# Patient Record
Sex: MALE | Race: WHITE | URBAN - METROPOLITAN AREA
[De-identification: names, ages, dates, MRNs, and addresses within clinical notes are randomized per-mention and may not be internally consistent; named-entity substitution may affect disease eponyms.]

---

## 2017-09-05 ENCOUNTER — OFFICE VISIT (OUTPATIENT)
Dept: FAMILY MEDICINE CLINIC | Age: 37
End: 2017-09-05

## 2017-09-05 VITALS
HEIGHT: 77 IN | TEMPERATURE: 98.1 F | WEIGHT: 297 LBS | SYSTOLIC BLOOD PRESSURE: 132 MMHG | RESPIRATION RATE: 18 BRPM | BODY MASS INDEX: 35.07 KG/M2 | HEART RATE: 76 BPM | DIASTOLIC BLOOD PRESSURE: 70 MMHG | OXYGEN SATURATION: 98 %

## 2017-09-05 DIAGNOSIS — F41.1 GAD (GENERALIZED ANXIETY DISORDER): ICD-10-CM

## 2017-09-05 DIAGNOSIS — Z72.0 TOBACCO ABUSE: ICD-10-CM

## 2017-09-05 DIAGNOSIS — Z99.89 OSA ON CPAP: ICD-10-CM

## 2017-09-05 DIAGNOSIS — Z83.3 FAMILY HISTORY OF DIABETES MELLITUS IN MOTHER: ICD-10-CM

## 2017-09-05 DIAGNOSIS — Z00.00 PREVENTATIVE HEALTH CARE: Primary | ICD-10-CM

## 2017-09-05 DIAGNOSIS — G47.33 OSA ON CPAP: ICD-10-CM

## 2017-09-05 DIAGNOSIS — Z82.49 FAMILY HISTORY OF CARDIOMEGALY: ICD-10-CM

## 2017-09-05 DIAGNOSIS — E66.9 OBESITY (BMI 30-39.9): ICD-10-CM

## 2017-09-05 RX ORDER — SERTRALINE HYDROCHLORIDE 50 MG/1
50 TABLET, FILM COATED ORAL DAILY
Qty: 90 TAB | Refills: 4 | Status: SHIPPED | OUTPATIENT
Start: 2017-09-05

## 2017-09-05 RX ORDER — SERTRALINE HYDROCHLORIDE 50 MG/1
TABLET, FILM COATED ORAL DAILY
COMMUNITY
End: 2017-09-05 | Stop reason: SDUPTHER

## 2017-09-05 RX ORDER — ALPRAZOLAM 0.5 MG/1
0.5 TABLET ORAL
Qty: 20 TAB | Refills: 0 | Status: SHIPPED | OUTPATIENT
Start: 2017-09-05

## 2017-09-05 RX ORDER — LORAZEPAM 0.5 MG/1
TABLET ORAL AS NEEDED
COMMUNITY
End: 2017-09-05

## 2017-09-05 NOTE — PATIENT INSTRUCTIONS
Ideas for diet:   Less trailmix, beer, and soda  Eat out as little as possible (pack a lunch, make dinners at home)  Make a weekly meal plan, then shop for the food you will need. You will be less tempted to eat out or eat junk food if you have a plan. Avoid drinks with calories (try crystal light, sugar-free sheri aid, sweet tea made with splenda, diet sodas, water with lime)   For lunch, consider whole wheat bread, light moss or mustard, turkey breast, light cheese (or no cheese), lots of veggies on sandwich   Low calorie side dishes/snacks: pretzels, baked chips, 100-calorie snacks, light yogurt, fresh fruit (apples, berries, oranges), cut up veggies with light dip, light popcorn   For supper, fill up 2/3 of plate with veggies (not corn or potatoes), then very small portions of everything else   If you use salad dressing, get light, and try dipping fork in it instead of putting it all over salad   Consider buying a light-cooking cookbook and trying some new recipes   Exercise more, even 10 minutes daily would help      See if you can find record of your last TDAP tetanus shot date and let me know        Well Visit, Ages 25 to 48: Care Instructions  Your Care Instructions  Physical exams can help you stay healthy. Your doctor has checked your overall health and may have suggested ways to take good care of yourself. He or she also may have recommended tests. At home, you can help prevent illness with healthy eating, regular exercise, and other steps. Follow-up care is a key part of your treatment and safety. Be sure to make and go to all appointments, and call your doctor if you are having problems. It's also a good idea to know your test results and keep a list of the medicines you take. How can you care for yourself at home? · Reach and stay at a healthy weight. This will lower your risk for many problems, such as obesity, diabetes, heart disease, and high blood pressure.   · Get at least 30 minutes of physical activity on most days of the week. Walking is a good choice. You also may want to do other activities, such as running, swimming, cycling, or playing tennis or team sports. Discuss any changes in your exercise program with your doctor. · Do not smoke or allow others to smoke around you. If you need help quitting, talk to your doctor about stop-smoking programs and medicines. These can increase your chances of quitting for good. · Talk to your doctor about whether you have any risk factors for sexually transmitted infections (STIs). Having one sex partner (who does not have STIs and does not have sex with anyone else) is a good way to avoid these infections. · Use birth control if you do not want to have children at this time. Talk with your doctor about the choices available and what might be best for you. · Protect your skin from too much sun. When you're outdoors from 10 a.m. to 4 p.m., stay in the shade or cover up with clothing and a hat with a wide brim. Wear sunglasses that block UV rays. Even when it's cloudy, put broad-spectrum sunscreen (SPF 30 or higher) on any exposed skin. · See a dentist one or two times a year for checkups and to have your teeth cleaned. · Wear a seat belt in the car. · Drink alcohol in moderation, if at all. That means no more than 2 drinks a day for men and 1 drink a day for women. Follow your doctor's advice about when to have certain tests. These tests can spot problems early. For everyone  · Cholesterol. Have the fat (cholesterol) in your blood tested after age 21. Your doctor will tell you how often to have this done based on your age, family history, or other things that can increase your risk for heart disease. · Blood pressure. Have your blood pressure checked during a routine doctor visit. Your doctor will tell you how often to check your blood pressure based on your age, your blood pressure results, and other factors. · Vision.  Talk with your doctor about how often to have a glaucoma test.  · Diabetes. Ask your doctor whether you should have tests for diabetes. · Colon cancer. Have a test for colon cancer at age 48. You may have one of several tests. If you are younger than 48, you may need a test earlier if you have any risk factors. Risk factors include whether you already had a precancerous polyp removed from your colon or whether your parent, brother, sister, or child has had colon cancer. For women  · Breast exam and mammogram. Talk to your doctor about when you should have a clinical breast exam and a mammogram. Medical experts differ on whether and how often women under 50 should have these tests. Your doctor can help you decide what is right for you. · Pap test and pelvic exam. Begin Pap tests at age 24. A Pap test is the best way to find cervical cancer. The test often is part of a pelvic exam. Ask how often to have this test.  · Tests for sexually transmitted infections (STIs). Ask whether you should have tests for STIs. You may be at risk if you have sex with more than one person, especially if your partners do not wear condoms. For men  · Tests for sexually transmitted infections (STIs). Ask whether you should have tests for STIs. You may be at risk if you have sex with more than one person, especially if you do not wear a condom. · Testicular cancer exam. Ask your doctor whether you should check your testicles regularly. · Prostate exam. Talk to your doctor about whether you should have a blood test (called a PSA test) for prostate cancer. Experts differ on whether and when men should have this test. Some experts suggest it if you are older than 39 and are -American or have a father or brother who got prostate cancer when he was younger than 72. When should you call for help? Watch closely for changes in your health, and be sure to contact your doctor if you have any problems or symptoms that concern you. Where can you learn more?   Go to http://crescencio-yanna.info/. Enter P072 in the search box to learn more about \"Well Visit, Ages 25 to 48: Care Instructions. \"  Current as of: July 19, 2016  Content Version: 11.3  © 5795-3131 [employer]. Care instructions adapted under license by [employer] (which disclaims liability or warranty for this information). If you have questions about a medical condition or this instruction, always ask your healthcare professional. [place] [address] any warranty or liability for your use of this information. To schedule an outpatient imaging test, please call:  [phone] [facility] scheduling)   (ECHO)    Medical Supply Stores:    Janiaah Castleman  Address: [facility], [address] [place] [address]   Phone: [phone]    [address] [place] [address]. Pharmacy  [address] [place] [address] ?   [phone] ? · [facility]     [address], [address] [place] [address]  [phone]  Same fax number    [facility] [address]   [phone]    [facility] (CPAP supplies)  Beatrice Byers[address], [place] [address]  Phone: [phone]  Fax: [phone]

## 2017-09-05 NOTE — PROGRESS NOTES
Chief Complaint   Patient presents with    New Patient     to [facility], new to [address] [place] [address] in preparation for visit and have obtained necessary documentation. Identified pt with two pt identifiers (Name @ )    Health Maintenance Due   Topic    DTaP/Tdap/Td series (1 - Tdap)    INFLUENZA AGE 9 TO ADULT          1. Have you been to the ER, urgent care clinic since your last visit? Hospitalized since your last visit? No    2. Have you seen or consulted any other health care providers outside of the [facility] since your last visit? Include any pap smears or colon screening.  No

## 2017-09-05 NOTE — PROGRESS NOTES
[address] [facility]. [place], [address]  [phone]             Date of visit: 9/5/2017   Subjective:      History obtained from:  the patient. Alex Tomlin is a 40 y.o. male who presents today for new patient to me, I see his wife, Manpreet Wagoner    Overall healthy  Due for physical and labs  Thinks he had tdap 2 yr ago, will try to get record  Doesn't want flu shot    Takes zoloft at least every other day but misses it when he doesn't  Has nervousness and sob with airplanes, in front of crowds  Has had mild panic attack before  Mostly just feeling like he has to get out of situation. Has xanax, 15 pills have lasted him 8 months    Desk job  Only exercising about once per week, sometimes swims, sometimes bike and some weights  Sits on ball for back and thinks it helps    Too much cheese and pizza he thinks  Wife is vegetarian so that helps  Breakfast: trail mix: nuts, raisins, cranraisins, dried fruits sometimes M&Ms  Lunch: brings different kinds sandwiches with mustard, a yogurt and a piece of fruit, might have a Tanja bar, slowly eats it throughout the days  Dinner: varies a lot, doesn't eat out much  Drinks: mostly coffee and some water, occasional soda  Etoh: 1 beer nightly, maybe more on weekends    Seeing a chiropractor for his back,David Butler  He is helping him  Back not that bad, a little worse today after pulling up stumps yesterday      Patient Active Problem List    Diagnosis Date Noted    YANIQUE (generalized anxiety disorder) 09/05/2017    Family history of cardiomegaly 09/05/2017    Family history of diabetes mellitus in mother 09/05/2017    EVERETT on CPAP 09/05/2017    Obesity (BMI 30-39.9) 09/05/2017     Current Outpatient Prescriptions   Medication Sig Dispense Refill    MULTIVIT-MINERALS/FA/LYCOPENE (ONE-A-DAY MEN'S MULTIVITAMIN PO) Take  by mouth.  fish oil-omega-3 fatty acids (FISH OIL) 340-1,000 mg capsule Take 1 Cap by mouth daily.       sertraline (ZOLOFT) 50 mg tablet Take 1 Tab by mouth daily. 90 Tab 4    ALPRAZolam (XANAX) 0.5 mg tablet Take 1 Tab by mouth daily as needed for Anxiety. 20 Tab 0    OTHER CPAP tubing and supplies for EVERETT G47.33 PARADISE 99, prog good 1 Each 2     Allergies   Allergen Reactions    Pcn [Penicillins] Unknown (comments)     Past Medical History:   Diagnosis Date    Anxiety     Depression     Hypercholesterolemia      Past Surgical History:   Procedure Laterality Date    ABDOMEN SURGERY PROC UNLISTED      HX HERNIA REPAIR      abd    HX ORTHOPAEDIC      left knee repair     Family History   Problem Relation Age of Onset    Diabetes Mother     No Known Problems Father     Heart Disease Sister 36     enlarged, sudden death    No Known Problems Brother     Prostate Cancer Maternal Grandfather     Prostate Cancer Paternal Grandfather      Social History   Substance Use Topics    Smoking status: Current Some Day Smoker     Packs/day: 1.00    Smokeless tobacco: Current User      Comment:  i pack last 2 weeks    Alcohol use Yes      Comment: beer daily      Social History     Social History Narrative     to Spike Degroot    Works for [employer] in [employer] [facility] [employer]        Review of Systems  Card: denies chest pain  Pulm: denies shortness of breath  GI: denies hematochezia  Skin: denies lesions  Psych: denies suicidal ideation      Objective:     Vitals:    09/05/17 1513 09/05/17 1553   BP: 151/75 132/70   Pulse: 76    Resp: 18    Temp: 98.1 °F (36.7 °C)    TempSrc: Oral    SpO2: 98%    Weight: 297 lb (134.7 kg)    Height: 6' 5\" (1.956 m)      Body mass index is 35.22 kg/(m^2).      General: stated age,obese, and in NAD  Eyes: PERRL, EOMI, no redness or drainage  Nose: no drainage  Mouth: no lesions  Throat: erythema, exudate or swelling  Neck: supple, symmetrical, trachea midline, no adenopathy and thyroid: not enlarged, symmetric, no tenderness/mass/nodules  Lungs:  clear to auscultation w/o rales, rhonchi, wheezes w/normal effort and no use of accessory muscles of respiration   Heart: regular rate and rhythm, S1, S2 normal, no murmur, click, rub or gallop  Abdomen: soft, nontender, no masses  Ext:  No edema noted. Lymph: no cervical adenopathy appreciated  Skin:  Normal. and no rash or abnormalities   Neuro: normal gait, CN 2-12 intact  Psych: alert and oriented to person, place, time and situation and Speech: appropriate quality, quantity and organization of sentences, normal affect  Assessment/Plan:       ICD-10-CM ICD-9-CM    1. Preventative health care Z00.00 V70.0 LIPID PANEL      METABOLIC PANEL, COMPREHENSIVE      CBC WITH AUTOMATED DIFF      HEMOGLOBIN A1C WITH EAG      TSH 3RD GENERATION      T4, FREE   2. YANIQUE (generalized anxiety disorder) F41.1 300.02 TSH 3RD GENERATION      T4, FREE   3. EVERETT on CPAP G47.33 327.23     Z99.89 V46.8    4. Tobacco abuse Z72.0 305.1    5. Family history of diabetes mellitus in mother Z83.3 V18.0 HEMOGLOBIN A1C WITH EAG   6. Family history of cardiomegaly Z82.49 V17.49 ECHO DOPPLER COMPLETE   7. Obesity (BMI 30-39. 9) E66.9 278.00         Orders Placed This Encounter    LIPID PANEL    METABOLIC PANEL, COMPREHENSIVE    CBC WITH AUTOMATED DIFF    HEMOGLOBIN A1C WITH EAG    TSH 3RD GENERATION    T4, FREE    ECHO DOPPLER COMPLETE    DISCONTD: sertraline (ZOLOFT) 50 mg tablet    DISCONTD: LORazepam (ATIVAN) 0.5 mg tablet    MULTIVIT-MINERALS/FA/LYCOPENE (ONE-A-DAY MEN'S MULTIVITAMIN PO)    fish oil-omega-3 fatty acids (FISH OIL) 340-1,000 mg capsule    sertraline (ZOLOFT) 50 mg tablet    ALPRAZolam (XANAX) 0.5 mg tablet    OTHER       Catching up on preventive care, really only needs labs  Encouraged trying flu shot but he is not high risk patient  Medical problems stable, no changes made, rarely needs xanax, Prescription monitoring appropriate.   Discussed that if he needed bp pill in future a nonselective beta blocker might help his situational anxiety, but really not needed now  Encouraged weight loss, discussed diet  I somehow missed while he was here that he uses tobacco; will contact him about that. Obviously want to help him quit  EVERETT feels ok to him on cpap, refill supplies  Echo due to family history sudden death in sister    Discussed the diagnosis and plan and he expressed understanding. Follow-up Disposition:  Return in about 1 year (around 9/5/2018).     Josie Benitez MD

## 2017-09-05 NOTE — MR AVS SNAPSHOT
Visit Information Date & Time Provider Department Dept. Phone Encounter #  
 9/5/2017  3:00 PM Javy Bae, [address] [phone] [id number] Follow-up Instructions Return in about 1 year (around 9/5/2018). Upcoming Health Maintenance Date Due DTaP/Tdap/Td series (1 - Tdap) 8/6/2001 INFLUENZA AGE 9 TO ADULT 8/1/2017 Allergies as of 9/5/2017  Review Complete On: 9/5/2017 By: Javy Bae MD  
  
 Severity Noted Reaction Type Reactions Pcn [Penicillins]  09/05/2017    Unknown (comments) Current Immunizations  Reviewed on 8/31/2017 Name Date DTaP 3/15/2016 Hep A Vaccine 3/3/1999, 7/16/1998 MMR 7/16/1998 Pertussis Vaccine 3/15/2016 Td 8/26/2009 Typhoid Vaccine 7/16/1998 Yellow Fever Vaccine 7/16/1998 Not reviewed this visit You Were Diagnosed With   
  
 Codes Comments Preventative health care    -  Primary ICD-10-CM: Z00.00 ICD-9-CM: V70.0   
 YANIQUE (generalized anxiety disorder)     ICD-10-CM: F41.1 ICD-9-CM: 300.02   
 EVERETT on CPAP     ICD-10-CM: G47.33, Z99.89 ICD-9-CM: 327.23, V46.8 Family history of diabetes mellitus in mother     ICD-10-CM: Z80.1 ICD-9-CM: V18.0 Family history of cardiomegaly     ICD-10-CM: Z82.49 
ICD-9-CM: V17.49 Obesity (BMI 30-39. 9)     ICD-10-CM: E66.9 ICD-9-CM: 278.00 Vitals BP Pulse Temp Resp Height(growth percentile) Weight(growth percentile) 132/70 76 98.1 °F (36.7 °C) (Oral) 18 6' 5\" (1.956 m) 297 lb (134.7 kg) SpO2 BMI Smoking Status 98% 35.22 kg/m2 Current Some Day Smoker Vitals History BMI and BSA Data Body Mass Index Body Surface Area  
 35.22 kg/m 2 2.71 m 2 Preferred Pharmacy Pharmacy Name Phone [id number] [place] [address] [phone] Your Updated Medication List  
  
   
 This list is accurate as of: 9/5/17  3:56 PM.  Always use your most recent med list.  
  
  
  
  
 ALPRAZolam 0.5 mg tablet Commonly known as:  Straughn Crater Take 1 Tab by mouth daily as needed for Anxiety. FISH -1,000 mg capsule Generic drug:  fish oil-omega-3 fatty acids Take 1 Cap by mouth daily. ONE-A-DAY MEN'S MULTIVITAMIN PO Take  by mouth. OTHER  
CPAP tubing and supplies for EVERETT G47.33 PARADISE 99, prog good  
  
 sertraline 50 mg tablet Commonly known as:  ZOLOFT Take 1 Tab by mouth daily. Prescriptions Printed Refills ALPRAZolam (XANAX) 0.5 mg tablet 0 Sig: Take 1 Tab by mouth daily as needed for Anxiety. Class: Print Route: Oral  
 OTHER 2 Sig: CPAP tubing and supplies for EVERETT G47.33 PARADISE 99, prog good Class: Print Prescriptions Sent to Pharmacy Refills  
 sertraline (ZOLOFT) 50 mg tablet 4 Sig: Take 1 Tab by mouth daily. Class: Normal  
 Pharmacy: [id number]- Kay Carty, [address] [place] [address] #: [phone] Route: Oral  
  
We Performed the Following CBC WITH AUTOMATED DIFF [19178 CPT(R)] HEMOGLOBIN A1C WITH EAG [67757 CPT(R)] LIPID PANEL [11443 CPT(R)] METABOLIC PANEL, COMPREHENSIVE [81665 CPT(R)] T4, FREE W444804 CPT(R)] TSH 3RD GENERATION [95984 CPT(R)] Follow-up Instructions Return in about 1 year (around 9/5/2018). To-Do List   
 09/05/2017 ECHO:  ECHO DOPPLER COMPLETE Patient Instructions Ideas for diet:  
Less trailmix, beer, and soda Eat out as little as possible (pack a lunch, make dinners at home) Make a weekly meal plan, then shop for the food you will need. You will be less tempted to eat out or eat junk food if you have a plan. Avoid drinks with calories (try crystal light, sugar-free sheri aid, sweet tea made with splenda, diet sodas, water with lime) For lunch, consider whole wheat bread, light moss or mustard, turkey breast, light cheese (or no cheese), lots of veggies on sandwich Low calorie side dishes/snacks: pretzels, baked chips, 100-calorie snacks, light yogurt, fresh fruit (apples, berries, oranges), cut up veggies with light dip, light popcorn For supper, fill up 2/3 of plate with veggies (not corn or potatoes), then very small portions of everything else If you use salad dressing, get light, and try dipping fork in it instead of putting it all over salad Consider buying a light-cooking cookbook and trying some new recipes Exercise more, even 10 minutes daily would help See if you can find record of your last TDAP tetanus shot date and let me know Well Visit, Ages 25 to 48: Care Instructions Your Care Instructions Physical exams can help you stay healthy. Your doctor has checked your overall health and may have suggested ways to take good care of yourself. He or she also may have recommended tests. At home, you can help prevent illness with healthy eating, regular exercise, and other steps. Follow-up care is a key part of your treatment and safety. Be sure to make and go to all appointments, and call your doctor if you are having problems. It's also a good idea to know your test results and keep a list of the medicines you take. How can you care for yourself at home? · Reach and stay at a healthy weight. This will lower your risk for many problems, such as obesity, diabetes, heart disease, and high blood pressure. · Get at least 30 minutes of physical activity on most days of the week. Walking is a good choice. You also may want to do other activities, such as running, swimming, cycling, or playing tennis or team sports. Discuss any changes in your exercise program with your doctor. · Do not smoke or allow others to smoke around you. If you need help quitting, talk to your doctor about stop-smoking programs and medicines. These can increase your chances of quitting for good. · Talk to your doctor about whether you have any risk factors for sexually transmitted infections (STIs). Having one sex partner (who does not have STIs and does not have sex with anyone else) is a good way to avoid these infections. · Use birth control if you do not want to have children at this time. Talk with your doctor about the choices available and what might be best for you. · Protect your skin from too much sun. When you're outdoors from 10 a.m. to 4 p.m., stay in the shade or cover up with clothing and a hat with a wide brim. Wear sunglasses that block UV rays. Even when it's cloudy, put broad-spectrum sunscreen (SPF 30 or higher) on any exposed skin. · See a dentist one or two times a year for checkups and to have your teeth cleaned. · Wear a seat belt in the car. · Drink alcohol in moderation, if at all. That means no more than 2 drinks a day for men and 1 drink a day for women. Follow your doctor's advice about when to have certain tests. These tests can spot problems early. For everyone · Cholesterol. Have the fat (cholesterol) in your blood tested after age 21. Your doctor will tell you how often to have this done based on your age, family history, or other things that can increase your risk for heart disease. · Blood pressure. Have your blood pressure checked during a routine doctor visit. Your doctor will tell you how often to check your blood pressure based on your age, your blood pressure results, and other factors. · Vision. Talk with your doctor about how often to have a glaucoma test. 
· Diabetes. Ask your doctor whether you should have tests for diabetes. · Colon cancer. Have a test for colon cancer at age 48. You may have one of several tests. If you are younger than 48, you may need a test earlier if you have any risk factors.  Risk factors include whether you already had a precancerous polyp removed from your colon or whether your parent, brother, sister, or child has had colon cancer. For women · Breast exam and mammogram. Talk to your doctor about when you should have a clinical breast exam and a mammogram. Medical experts differ on whether and how often women under 50 should have these tests. Your doctor can help you decide what is right for you. · Pap test and pelvic exam. Begin Pap tests at age 24. A Pap test is the best way to find cervical cancer. The test often is part of a pelvic exam. Ask how often to have this test. 
· Tests for sexually transmitted infections (STIs). Ask whether you should have tests for STIs. You may be at risk if you have sex with more than one person, especially if your partners do not wear condoms. For men · Tests for sexually transmitted infections (STIs). Ask whether you should have tests for STIs. You may be at risk if you have sex with more than one person, especially if you do not wear a condom. · Testicular cancer exam. Ask your doctor whether you should check your testicles regularly. · Prostate exam. Talk to your doctor about whether you should have a blood test (called a PSA test) for prostate cancer. Experts differ on whether and when men should have this test. Some experts suggest it if you are older than 39 and are -American or have a father or brother who got prostate cancer when he was younger than 72. When should you call for help? Watch closely for changes in your health, and be sure to contact your doctor if you have any problems or symptoms that concern you. Where can you learn more? Go to http://crescencio-yanna.info/. Enter P072 in the search box to learn more about \"Well Visit, Ages 25 to 48: Care Instructions. \" Current as of: July 19, 2016 Content Version: 11.3 © 4122-4758 [employer].  Care instructions adapted under license by [address] (which disclaims liability or warranty for this information). If you have questions about a medical condition or this instruction, always ask your healthcare professional. Em Gore any warranty or liability for your use of this information. To schedule an outpatient imaging test, please call: 
[phone] [facility] scheduling) 
 (ECHO) Medical Supply Stores: 
 
Ninoska Moon Address: [facility], [address] [place] [address] Phone: [phone] [facility]. Pharmacy [address] [place], [place] ?  
[phone] ? · Gust[employer]. [employer] [address] [facility], [address] [facility] 
[phone] Same fax number [facility] [place] Tom [place] [address]  
[phone] [facility] (CPAP supplies) [facility] [address] [place] [address] Phone: [phone] Fax: [phone] Introducing [address]! Stefany Garcia introduces [employer] patient portal. Now you can access parts of your medical record, email your doctor's office, and request medication refills online. 1. In your internet browser, go to [email or web address] 2. Click on the First Time User? Click Here link in the Sign In box. You will see the New Member Sign Up page. 3. Enter your [employer] Access Code exactly as it appears below. You will not need to use this code after youve completed the sign-up process. If you do not sign up before the expiration date, you must request a new code. · [employer] Access Code: [email or web address] Expires: 12/4/2017  3:56 PM 
 
4. Enter the last four digits of your Social Security Number (xxxx) and Date of Birth (mm/dd/yyyy) as indicated and click Submit. You will be taken to the next sign-up page. 5. Create a Obviousideat ID. This will be your MyChart login ID and cannot be changed, so think of one that is secure and easy to remember. 6. Create a Chug password. You can change your password at any time. 7. Enter your Password Reset Question and Answer. This can be used at a later time if you forget your password. 8. Enter your e-mail address. You will receive e-mail notification when new information is available in [address] 9. Click Sign Up. You can now view and download portions of your medical record. 10. Click the Download Summary menu link to download a portable copy of your medical information. If you have questions, please visit the Frequently Asked Questions section of the Ch[employer] website. Remember, Chug is NOT to be used for urgent needs. For medical emergencies, dial 911. Now available from your iPhone and Android! Please provide this summary of care documentation to your next provider. Your primary care clinician is listed as Miladys Jimenez. If you have any questions after today's visit, please call [phone].

## 2017-09-06 DIAGNOSIS — E78.1 HYPERTRIGLYCERIDEMIA: Primary | ICD-10-CM

## 2017-09-06 LAB
ALBUMIN SERPL-MCNC: 4.6 G/DL (ref 3.5–5.5)
ALBUMIN/GLOB SERPL: 1.8 {RATIO} (ref 1.2–2.2)
ALP SERPL-CCNC: 61 IU/L (ref 39–117)
ALT SERPL-CCNC: 36 IU/L (ref 0–44)
AST SERPL-CCNC: 27 IU/L (ref 0–40)
BASOPHILS # BLD AUTO: 0.1 X10E3/UL (ref 0–0.2)
BASOPHILS NFR BLD AUTO: 1 %
BILIRUB SERPL-MCNC: 0.3 MG/DL (ref 0–1.2)
BUN SERPL-MCNC: 13 MG/DL (ref 6–20)
BUN/CREAT SERPL: 16 (ref 9–20)
CALCIUM SERPL-MCNC: 9.5 MG/DL (ref 8.7–10.2)
CHLORIDE SERPL-SCNC: 100 MMOL/L (ref 96–106)
CHOLEST SERPL-MCNC: 217 MG/DL (ref 100–199)
CO2 SERPL-SCNC: 26 MMOL/L (ref 18–29)
CREAT SERPL-MCNC: 0.82 MG/DL (ref 0.76–1.27)
EOSINOPHIL # BLD AUTO: 0.2 X10E3/UL (ref 0–0.4)
EOSINOPHIL NFR BLD AUTO: 3 %
ERYTHROCYTE [DISTWIDTH] IN BLOOD BY AUTOMATED COUNT: 13.3 % (ref 12.3–15.4)
EST. AVERAGE GLUCOSE BLD GHB EST-MCNC: 108 MG/DL
GLOBULIN SER CALC-MCNC: 2.6 G/DL (ref 1.5–4.5)
GLUCOSE SERPL-MCNC: 103 MG/DL (ref 65–99)
HBA1C MFR BLD: 5.4 % (ref 4.8–5.6)
HCT VFR BLD AUTO: 42.7 % (ref 37.5–51)
HDLC SERPL-MCNC: 34 MG/DL
HGB BLD-MCNC: 14.3 G/DL (ref 12.6–17.7)
IMM GRANULOCYTES # BLD: 0 X10E3/UL (ref 0–0.1)
IMM GRANULOCYTES NFR BLD: 0 %
INTERPRETATION, 910389: NORMAL
LDLC SERPL CALC-MCNC: ABNORMAL MG/DL (ref 0–99)
LYMPHOCYTES # BLD AUTO: 2.1 X10E3/UL (ref 0.7–3.1)
LYMPHOCYTES NFR BLD AUTO: 31 %
MCH RBC QN AUTO: 29.6 PG (ref 26.6–33)
MCHC RBC AUTO-ENTMCNC: 33.5 G/DL (ref 31.5–35.7)
MCV RBC AUTO: 88 FL (ref 79–97)
MONOCYTES # BLD AUTO: 0.5 X10E3/UL (ref 0.1–0.9)
MONOCYTES NFR BLD AUTO: 7 %
NEUTROPHILS # BLD AUTO: 3.9 X10E3/UL (ref 1.4–7)
NEUTROPHILS NFR BLD AUTO: 58 %
PDF IMAGE, 910387: NORMAL
PLATELET # BLD AUTO: 229 X10E3/UL (ref 150–379)
POTASSIUM SERPL-SCNC: 4.4 MMOL/L (ref 3.5–5.2)
PROT SERPL-MCNC: 7.2 G/DL (ref 6–8.5)
RBC # BLD AUTO: 4.83 X10E6/UL (ref 4.14–5.8)
SODIUM SERPL-SCNC: 142 MMOL/L (ref 134–144)
T4 FREE SERPL-MCNC: 1.14 NG/DL (ref 0.82–1.77)
TRIGL SERPL-MCNC: 529 MG/DL (ref 0–149)
TSH SERPL DL<=0.005 MIU/L-ACNC: 1.18 UIU/ML (ref 0.45–4.5)
VLDLC SERPL CALC-MCNC: ABNORMAL MG/DL (ref 5–40)
WBC # BLD AUTO: 6.8 X10E3/UL (ref 3.4–10.8)

## 2017-09-06 NOTE — PROGRESS NOTES
Sent in letter:  Triglycerides are moderately high, which makes it hard to measure the other cholesterol numbers. I recommend you go up to a total of 3 fish oil pills daily (all at once or spread out throughout the day) and see me in 2-3 months for us to recheck. I also want to discuss helping you quit tobacco, as I realized after you left that I hadn't talked to you about that (sorry!)  If we help you quit all tobacco, your cholesterol numbers will be much less significant to your cardiac risk. Thankfully, other labs were fine, including thyroid, sugar, electrolytes, kidney, liver, and blood counts. It was very nice to meet you!

## 2017-11-03 ENCOUNTER — TELEPHONE (OUTPATIENT)
Dept: FAMILY MEDICINE CLINIC | Age: 37
End: 2017-11-03

## 2017-11-03 NOTE — TELEPHONE ENCOUNTER
Patient is requesting a prescription for \"Resmed/C Pac\", its a machine that helps him breathe at night for sleep aponea it was written by [facility] in [facility], but now it needs to be written by  to repair or replace it needs to be faxed to [place] [facility].   Fax [phone]  Best call back number [phone]

## 2017-11-03 NOTE — TELEPHONE ENCOUNTER
Called pt and asked him to call MD that did sleep study and ask them to send report, results and previous order. I gave hm my direct fax #.

## 2017-11-09 ENCOUNTER — TELEPHONE (OUTPATIENT)
Dept: FAMILY MEDICINE CLINIC | Age: 37
End: 2017-11-09

## 2017-11-09 NOTE — TELEPHONE ENCOUNTER
----- Message from Aidan Dixon sent at 11/8/2017  2:53 PM EST -----  Regarding: Dr. Dusty Vigil  Pt stated, Dr. Gio Adhikari office, needs a release request form sent from pt's PCP in order to release pt's sleep study results. The request form can be faxed to [phone]. Best contact number [phone].

## 2017-11-09 NOTE — TELEPHONE ENCOUNTER
I faxed a request for them to send the sleep study. Received notes from Dr Shoshana Cueva re: sleep study. Patient is requesting a prescription for \"Resmed/C Pac\", its a machine that helps him breathe at night for sleep aponea it was written by [facility] in [facility], but now it needs to be written by  to repair or replace it needs to be faxed to [place] [facility].   Fax [phone]